# Patient Record
Sex: MALE | Race: WHITE | NOT HISPANIC OR LATINO | Employment: STUDENT | URBAN - METROPOLITAN AREA
[De-identification: names, ages, dates, MRNs, and addresses within clinical notes are randomized per-mention and may not be internally consistent; named-entity substitution may affect disease eponyms.]

---
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
## 2025-08-08 ENCOUNTER — OFFICE VISIT (OUTPATIENT)
Dept: PRIMARY CARE | Facility: CLINIC | Age: 20
End: 2025-08-08
Payer: COMMERCIAL

## 2025-08-08 VITALS
SYSTOLIC BLOOD PRESSURE: 110 MMHG | HEART RATE: 88 BPM | OXYGEN SATURATION: 98 % | DIASTOLIC BLOOD PRESSURE: 60 MMHG | HEIGHT: 69 IN | BODY MASS INDEX: 26.07 KG/M2 | WEIGHT: 176 LBS

## 2025-08-08 DIAGNOSIS — Z00.00 HEALTHCARE MAINTENANCE: Primary | ICD-10-CM

## 2025-08-08 PROCEDURE — 1036F TOBACCO NON-USER: CPT | Performed by: INTERNAL MEDICINE

## 2025-08-08 PROCEDURE — 3008F BODY MASS INDEX DOCD: CPT | Performed by: INTERNAL MEDICINE

## 2025-08-08 PROCEDURE — 99385 PREV VISIT NEW AGE 18-39: CPT | Performed by: INTERNAL MEDICINE

## 2025-08-08 RX ORDER — METHYLPREDNISOLONE 4 MG/1
4 TABLET ORAL ONCE
COMMUNITY
Start: 2025-07-03 | End: 2025-08-08 | Stop reason: ALTCHOICE

## 2025-08-08 ASSESSMENT — ENCOUNTER SYMPTOMS
CONSTIPATION: 0
SLEEP DISTURBANCE: 0
DIARRHEA: 0
ARTHRALGIAS: 0
BLOOD IN STOOL: 0
ABDOMINAL PAIN: 0
SHORTNESS OF BREATH: 0

## 2025-08-08 NOTE — PROGRESS NOTES
Subjective   Patient ID: Dann Irene is a 19 y.o. male who presents for New Patient Visit (He is here to establish his care).  He previously followed with  from Pediatrics.  The patient is generally doing well and denies any complaints.    The patient reports being evaluated in the ED in 3/2025 for an episode thought to be due to traumatic balanitis which has since subsided.  He denies any recurrence.    The patient denies any hearing impairment or vision changes, and wears prescription lenses.  She follows regularly with a Dentist for previous cavities as well as a root canal.  He reports good sleep quality and maintains a healthy lifestyle with good diet and regular physical activity.      The patient denies any dyspnea, chest pressure, chest pain, abdominal pain, hematochezia, melena, bowel problems, or joint pain.     The patient reports a history of experiencing urticaria with penicillin antibiotics.    The patient is not taking any chronic medications, and denies any personal surgical history.    The patient denies any known family history of morbidity or mortality in first degree relatives.    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      Review of Systems   HENT:  Negative for hearing loss.    Eyes:  Negative for visual disturbance.   Respiratory:  Negative for shortness of breath.    Cardiovascular:  Negative for chest pain.   Gastrointestinal:  Negative for abdominal pain, blood in stool, constipation and diarrhea.   Musculoskeletal:  Negative for arthralgias.   Psychiatric/Behavioral:  Negative for sleep disturbance.      Objective   Physical Exam  Constitutional:       Appearance: Normal appearance.   Neck:      Vascular: No carotid bruit.     Cardiovascular:      Rate and Rhythm: Normal rate and regular rhythm.      Heart sounds: Normal heart sounds.   Pulmonary:      Effort: Pulmonary effort is normal.       "Breath sounds: Normal breath sounds.   Abdominal:      General: Bowel sounds are normal.      Palpations: Abdomen is soft.      Tenderness: There is no abdominal tenderness.     Skin:     General: Skin is warm and dry.     Neurological:      General: No focal deficit present.      Mental Status: He is alert and oriented to person, place, and time. Mental status is at baseline.     Psychiatric:         Mood and Affect: Mood normal.         Behavior: Behavior normal.       Assessment/Plan   Problem List Items Addressed This Visit    None  Visit Diagnoses         Codes      Healthcare maintenance    -  Primary Z00.00            CPE Performed  -  Discussed healthy diet and regular exercise.    -  Physical exam overall unremarkable. Immunizations reviewed and updated accordingly. Healthy lifestyle choices discussed (tobacco avoidance, appropriate alcohol use, avoidance of illicit substances).   -  Patient is wearing seatbelt.   -  Screening lab work ordered as indicated.    -  Age appropriate screening tests reviewed with patient.        IMPRESSIONS/PLAN:     /60 in the office today.    Health Maintenance  - Routine labs 5/2024, 3/2021.  Tdap (6/11/2024) UTD.  Follow-up in 1 year.    Follow-up according to routine health maintenance, call sooner if needed.      \"I, Dr. Deleon, personally performed the services described in the documentation as scribed by Tory Green in my presence, and confirm it is both accurate and complete.   Scribe Attestation     Scribe Attestation  By signing my name below, ITory, Scribe   attest that this documentation has been prepared under the direction and in the presence of Jamari Deleon DO.   Tory Green 08/08/25 11:16 AM   "

## 2026-08-11 ENCOUNTER — APPOINTMENT (OUTPATIENT)
Dept: PRIMARY CARE | Facility: CLINIC | Age: 21
End: 2026-08-11
Payer: COMMERCIAL